# Patient Record
(demographics unavailable — no encounter records)

---

## 2025-04-07 NOTE — HISTORY OF PRESENT ILLNESS
[TextBox_4] : 89yo F PMH breast malignancy s/p lumpectomy + RT + DTM1 chemo c/b pulmonary toxicity, h/o LE DVT i/s/o malignancy on Eliquis, psoriatic arthritis on methotrexate, HTN, hypothyroidism, macular degeneration, h/o autoimmune hepatitis, p/w several weeks of flu-like symptoms and admitted for acute hypoxic RF due to community acquired pneumonia s/p antibiotic course of 5 days of ceftriaxone, 3 days of azithromycin. Pulmonary consulted for persistent hypoxia at rest/exertion and abnormal CT scan.

## 2025-04-07 NOTE — PROCEDURE
[FreeTextEntry1] :  Data reviewed: abs eosinophil count 550s pBNP 253 (on admission) 3/19 TTE: normal LV size, systolic (EF 68%) and diastolic function, wall thickness. No valvular abnormalities. PASP 36 mmHg. No effusion. 3/20 CT chest: diffuse ground glass opacities with bronchovascular pattern and associated interlobular septal thickening, trace effusions bilaterally 3/21 POCUS: bilateral B line pattern, trace to minimal effusions -- 8/2018 CT chest: no focal infiltrate or pleural effusion. No evidence of diffuse lung process 2/2020 PFTs: mild restriction, essentially normal

## 2025-05-20 NOTE — HISTORY OF PRESENT ILLNESS
[TextBox_4] : patient treated with tapering course of steroids for acute hsp, now better and chest film is clear had six minute walk test and pfts aside from low diffusion her pfts are fine but she had toxicity to drug in years gone by here reviewed old ct,  and the one from hospital

## 2025-05-20 NOTE — PHYSICAL EXAM
[No Acute Distress] : no acute distress [Normal Oropharynx] : normal oropharynx [Normal Appearance] : normal appearance [No Neck Mass] : no neck mass [Normal Rate/Rhythm] : normal rate/rhythm [Normal S1, S2] : normal s1, s2 [No Murmurs] : no murmurs [No Resp Distress] : no resp distress [Clear to Auscultation Bilaterally] : clear to auscultation bilaterally [No Abnormalities] : no abnormalities [Benign] : benign [Normal Gait] : normal gait [No Clubbing] : no clubbing [No Cyanosis] : no cyanosis [No Edema] : no edema [FROM] : FROM [Normal Color/ Pigmentation] : normal color/ pigmentation [No Focal Deficits] : no focal deficits [Oriented x3] : oriented x3 [Normal Affect] : normal affect [TextBox_125] : extensive work facially

## 2025-05-20 NOTE — PROCEDURE
[FreeTextEntry1] : pfts are normal aside from low diffusion have no idea if that is new or chronic chest film is now normal

## 2025-05-20 NOTE — REASON FOR VISIT
[Follow-Up] : a follow-up visit [TextBox_44] : patient had been seen in the hospital and dx of hsp made  on tapering steroids

## 2025-07-09 NOTE — REASON FOR VISIT
Quang Amaro is a 22 year old male presenting to clinic today with complaints of cough and runny nose for the past 10 days now.  Pt reports that he has not been checking his temperature.    OTC medications used:  None  Denies  known Latex allergy or symptoms of Latex sensitivity.  Non-smoker.    All allergies and medications reviewed.  PCP verified:  No PCP at this time.    Pharm[facility] verified:  [facility]  Patient would like communication of their results via:        Cell Phone:  [phone]  No relevant phone numbers on file.     Okay to leave a message containing results? Yes     [Post Hospitalization] : a post hospitalization visit

## 2025-07-15 NOTE — HISTORY OF PRESENT ILLNESS
[FreeTextEntry1] : 88yoF with PMHx of left lower extremity DVT on Eliquis, hypothyroidism, HLD who was admitted to [facility] after a mechanical trip and fall at home, S/P L LE debridement/skin graft and local flap (5/28) with plastic surgery. Patient has known history of L carotid stenosis, however, during workup for fall during current admission there was carotid and vertebral artery arthrosclerosis noted on imaging. Vascular surgery was consulted, and repeat carotid ultrasound completed without any indication of stenosis of either carotid artery b/l.CT Cervical spine showing severe atherosclerotic changes of the left common carotid bulb and left vertebral artery V4 segment. Repeat carotid ultrasound without significant stenosis of carotid artery b/l. She denies dizziness, lightheadedness, prior hx of TIA, stroke, other neurologic symptoms. She noticed skin discoloration of her lower legs and ankles and concerned about circulation. Denies claudication, rest pain, skin breakdown.

## 2025-07-15 NOTE — PROCEDURE
[FreeTextEntry1] : Carotid duplex was performed in the office that demonstrated BL ICA with <50% stenosis, L ICA with fibrocalcific plaques with irregular borders noted in the carotid bulb and prox. ICA with no evidence of significant stenosis

## 2025-07-15 NOTE — HISTORY OF PRESENT ILLNESS
[FreeTextEntry1] : 88yoF with PMHx of left lower extremity DVT on Eliquis, hypothyroidism, HLD who was admitted to [facility] after a mechanical trip and fall at home, S/P L LE debridement/skin graft and local flap (5/28) with plastic surgery. Patient has known history of L carotid stenosis, however, during workup for fall during current admission there was carotid and vertebral artery arthrosclerosis noted on imaging. Vascular surgery was consulted, and repeat carotid ultrasound completed without any indication of stenosis of either carotid artery b/l.CT Cervical spine showing severe atherosclerotic changes of the left common carotid bulb and left vertebral artery V4 segment. Repeat carotid ultrasound without significant stenosis of carotid artery b/l. She denies dizziness, lightheadedness, prior hx of TIA, stroke, other neurologic symptoms. She noticed skin discoloration of her lower legs and ankles and concerned about circulation. Denies claudication, rest pain, skin breakdown.

## 2025-07-15 NOTE — PHYSICAL EXAM
[Respiratory Effort] : normal respiratory effort [Normal Heart Sounds] : normal heart sounds [Alert] : alert [Calm] : calm [2+] : left 2+ [1+] : left 1+ [Varicose Veins Of Lower Extremities] : bilaterally [Ankle Swelling On The Right] : mild [Carotid Bruits] : no carotid bruits [Right Carotid Bruit] : no bruit heard over the right carotid [Left Carotid Bruit] : no bruit heard over the left carotid [Ankle Swelling (On Exam)] : not present [] : not present [Abdomen Tenderness] : ~T ~M No abdominal tenderness [de-identified] : WN/WD, NAD [de-identified] : NC/AT [de-identified] : supple [de-identified] : FROM throughout [de-identified] : spider veins over BL ankles [de-identified] : grossly intact

## 2025-07-15 NOTE — ASSESSMENT
[Arterial/Venous Disease] : arterial/venous disease [FreeTextEntry1] : 88yoF s/p recent fall resulting in hospitalization who was noted to have carotid disease on the imaging presents for evaluation. Denies hx of stroke, TIA, other neurologic symptoms or events. On exam, no carotid bruit bilaterally, neurologic exam grossly intact. Palpable peripheral pulses throughout. Carotid duplex was performed in the office that demonstrated BL ICA with <50% stenosis, L ICA with fibrocalcific plaques with irregular borders noted in the carotid bulb and prox. ICA with no evidence of significant stenosis. CT cervical spine images were reviewed again. We discussed the findings and reassured the patient that no vascular intervention is needed. C/w Eliquis/statin. F/u as needed.

## 2025-07-15 NOTE — PHYSICAL EXAM
[Respiratory Effort] : normal respiratory effort [Normal Heart Sounds] : normal heart sounds [Alert] : alert [Calm] : calm [2+] : left 2+ [1+] : left 1+ [Varicose Veins Of Lower Extremities] : bilaterally [Ankle Swelling On The Right] : mild [Carotid Bruits] : no carotid bruits [Right Carotid Bruit] : no bruit heard over the right carotid [Left Carotid Bruit] : no bruit heard over the left carotid [Ankle Swelling (On Exam)] : not present [] : not present [Abdomen Tenderness] : ~T ~M No abdominal tenderness [de-identified] : WN/WD, NAD [de-identified] : NC/AT [de-identified] : supple [de-identified] : FROM throughout [de-identified] : spider veins over BL ankles [de-identified] : grossly intact

## 2025-07-15 NOTE — PHYSICAL EXAM
[Respiratory Effort] : normal respiratory effort [Normal Heart Sounds] : normal heart sounds [Alert] : alert [Calm] : calm [2+] : left 2+ [1+] : left 1+ [Varicose Veins Of Lower Extremities] : bilaterally [Ankle Swelling On The Right] : mild [Carotid Bruits] : no carotid bruits [Right Carotid Bruit] : no bruit heard over the right carotid [Left Carotid Bruit] : no bruit heard over the left carotid [Ankle Swelling (On Exam)] : not present [] : not present [Abdomen Tenderness] : ~T ~M No abdominal tenderness [de-identified] : WN/WD, NAD [de-identified] : NC/AT [de-identified] : supple [de-identified] : FROM throughout [de-identified] : spider veins over BL ankles [de-identified] : grossly intact

## 2025-07-15 NOTE — ADDENDUM
[FreeTextEntry1] : This note was written by Julia NGUYEN, acting as a scribe for Dr. Jonathan Foote.  I, Dr. Jonathan Foote, have read and attest that all the information, medical decision-making, and discharge instructions within are true and accurate.  I, Dr. Jonathan Foote, personally performed the evaluation and management (E/M) services for this new patient.  That E/M includes conducting the initial examination, assessing all conditions, and establishing the plan of care.  Today, my ACP, Julia NGUYEN, was here to observe my evaluation and management services for this patient to be followed going forward.  I have spent 60 minutes of time on the encounter which excludes teaching and separately reported services.